# Patient Record
Sex: MALE | Race: WHITE | URBAN - METROPOLITAN AREA
[De-identification: names, ages, dates, MRNs, and addresses within clinical notes are randomized per-mention and may not be internally consistent; named-entity substitution may affect disease eponyms.]

---

## 2018-03-01 NOTE — PROGRESS NOTES
SUBJECTIVE:   Dominik Jang is a 27 year old male who presents to clinic today for the following health issues:      ABDOMINAL PAIN and mid-upper back pain-saw different doc last week did lipase test and results were normal. [facility]     Onset: a few months ago    Description:   Character: Stomach-dull ache, back more sever ache  Location: mid to upper back, upper abdomen  Radiation: Back    Intensity: moderate    Progression of Symptoms:  worsening    Accompanying Signs & Symptoms:  Fever/Chills?: no   Gas/Bloating: YES- bloating  Nausea: no   Vomitting: no   Diarrhea?: no   Constipation:no   Dysuria or Hematuria: no    History:   Trauma: no   Previous similar pain: no    Previous tests done: Labs with [facility]    Precipitating factors:   Does the pain change with:     Food: no      BM: no     Urination: no     Alleviating factors:  fasting    Therapies Tried and outcome: prilosec-no help, after trying for 3-4 days, ibuprofen for pain         Had normal LFTs, lipase, H. pylori IgG last week.  Was offered RUQ U/S but did not pursue apparently.    Does have episodes of RUQ and back pain 2 hours after meals lasting a few hours.  No fever, chills, or night sweats, urinary urgency, frequency, dysuria, hematuria, other abdominal pain, nausea, vomiting, diarrhea, hematochezia, melena, or acholic stools.    Drinking about a beer every 2-3 weeks now.    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 RUQ abdominal pain    2. Chronic right-sided thoracic back pain    3. Gastroesophageal reflux disease, esophagitis presence not specified        PMH: Updated and/or reviewed in chart.    PSH: Updated and/or reviewed in chart.    Family History: Updated and/or reviewed in chart.   Family History   Problem Relation Age of Onset     Gallbladder Disease Mother      Gallbladder Disease Maternal Grandmother        ROS:  Constitutional,  HEENT, cardiovascular, pulmonary, gi and gu systems are otherwise negative.    OBJECTIVE:                                                    /80  Pulse 68  Temp 98.3  F (36.8  C) (Oral)  Wt 167 lb 9.6 oz (76 kg)  SpO2 97%    GEN: No acute distress  EYES: No conjunctival injection or icterus, EOMI grossly intact  RESP: Unlabored, regular  ABDO: soft, mildly tender RUQ, otherwise non-tender, non-distended, no hepatosplenomegaly or masses, negative vs equivocal Eden's  BACK: no CVA tenderness to palpation   NEURO: Normal gait, MAEx4, light touch sensation grossly intact  PSYCH: Normal mood and affect       ASSESSMENT/PLAN:                                                    1. RUQ abdominal pain  2. Chronic right-sided thoracic back pain  I agree with previous assessments that it is reasonable to pursue RUQ U/S to evaluate for GB disease.  If negative, would consider GERD, PUD as contributors and more aggressively pursue treatment there.  Patient agreed with plan and demonstrated understanding to contact us for help if not improving or sooner if worsening or if other questions or concerns arise.  Non-acute today.    - US Abdomen Limited; Future    3. Gastroesophageal reflux disease, esophagitis presence not specified  Discussed GERD hygiene briefly.       Orders Placed This Encounter     US Abdomen Limited              Jesus De Anda MD

## 2018-03-02 ENCOUNTER — OFFICE VISIT (OUTPATIENT)
Dept: INTERNAL MEDICINE | Facility: CLINIC | Age: 28
End: 2018-03-02
Payer: COMMERCIAL

## 2018-03-02 VITALS
TEMPERATURE: 98.3 F | OXYGEN SATURATION: 97 % | DIASTOLIC BLOOD PRESSURE: 80 MMHG | WEIGHT: 167.6 LBS | HEART RATE: 68 BPM | SYSTOLIC BLOOD PRESSURE: 132 MMHG

## 2018-03-02 DIAGNOSIS — K21.9 GASTROESOPHAGEAL REFLUX DISEASE, ESOPHAGITIS PRESENCE NOT SPECIFIED: ICD-10-CM

## 2018-03-02 DIAGNOSIS — M54.6 CHRONIC RIGHT-SIDED THORACIC BACK PAIN: ICD-10-CM

## 2018-03-02 DIAGNOSIS — R10.11 RUQ ABDOMINAL PAIN: Primary | ICD-10-CM

## 2018-03-02 DIAGNOSIS — G89.29 CHRONIC RIGHT-SIDED THORACIC BACK PAIN: ICD-10-CM

## 2018-03-02 PROCEDURE — 99203 OFFICE O/P NEW LOW 30 MIN: CPT | Performed by: INTERNAL MEDICINE

## 2018-03-02 NOTE — NURSING NOTE
Chief Complaint   Patient presents with     Abdominal Pain     Back Pain       Initial /80  Pulse 68  Temp 98.3  F (36.8  C) (Oral)  Wt 167 lb 9.6 oz (76 kg)  SpO2 97% There is no height or weight on file to calculate BMI.  Medication Reconciliation: complete   Moira Pearl MA

## 2018-03-02 NOTE — MR AVS SNAPSHOT
"              After Visit Summary   3/2/2018    Dominik Jang    MRN: [id number]           Patient Information     Date Of Birth          1990        Visit Information        Provider Department      3/2/2018 1:30 PM Jesus De Anda MD [facility]        Today's Diagnoses     RUQ abdominal pain    -  1    Chronic right-sided thoracic back pain        Gastroesophageal reflux disease, esophagitis presence not specified           Follow-ups after your visit        Future tests that were ordered for you today     Open Future Orders        Priority Expected Expires Ordered    US Abdomen Limited Routine  3/2/2019 3/2/2018            Who to contact     Normal or non-critical lab and imaging results will be communicated to you by [employer], letter or phone within 4 business days after the clinic has received the results. If you do not hear from us within 7 days, please contact the clinic through [employer] or phone. If you have a critical or abnormal lab result, we will notify you by phone as soon as possible.  Submit refill requests through [employer] or call your pharmacy and they will forward the refill request to us. Please allow 3 business days for your refill to be completed.          If you need to speak with a  for additional information , please call: [phone]             Additional Information About Your Visit        [employer] Managed Hosting Information     [employer] lets you send messages to your doctor, view your test results, renew your prescriptions, schedule appointments and more. To sign up, go to [email or web address] . Click on \"Log in\" on the left side of the screen, which will take you to the Welcome page. Then click on \"Sign up Now\" on the right side of the page.     You will be asked to enter the access code listed below, as well as some personal information. Please follow the directions to create your username and password.     Your access code is: [email or web address]  Expires: 5/31/2018  2:00 " PM     Your access code will  in 90 days. If you need help or a new code, please call your [facility] clinic or [phone].        Care EveryWhere ID     This is your Care EveryWhere ID. This could be used by other organizations to access your [facility] medical records  [id number]        Your Vitals Were     Pulse Temperature Pulse Oximetry             68 98.3  F (36.8  C) (Oral) 97%          Blood Pressure from Last 3 Encounters:   18 132/80   10/27/10 122/80    Weight from Last 3 Encounters:   18 167 lb 9.6 oz (76 kg)   10/27/10 142 lb (64.4 kg)               Primary Care Provider Office Phone # Fax #    [facility] [place] [phone] [address] [facility] [address]        Equal Access to Services     TOBY PARKINSON : Hadii aad ku hadasho Soomaali, waaxda luqadaha, qaybta kaalmada adeegyada, mireya allen hayjackie hilton . So [facility] [phone].    ATENCIÓN: Si habla español, tiene a klein disposición servicios gratuitos de asistencia lingüística. Alexis al [phone].    We comply with applicable federal civil rights laws and [place] laws. We do not discriminate on the basis of race, color, national origin, age, disability, sex, sexual orientation, or gender identity.            Thank you!     Thank you for choosing [facility]  for your care. Our goal is always to provide you with excellent care. Hearing back from our patients is one way we can continue to improve our services. Please take a few minutes to complete the written survey that you may receive in the mail after your visit with us. Thank you!             Your Updated Medication List - Protect others around you: Learn how to safely use, store and throw away your medicines at www.disposemymeds.org.      Notice  As of 3/2/2018  2:00 PM    You have not been prescribed any medications.

## 2018-03-05 ENCOUNTER — RADIANT APPOINTMENT (OUTPATIENT)
Dept: ULTRASOUND IMAGING | Facility: CLINIC | Age: 28
End: 2018-03-05
Attending: INTERNAL MEDICINE
Payer: COMMERCIAL

## 2018-03-05 DIAGNOSIS — M54.6 CHRONIC RIGHT-SIDED THORACIC BACK PAIN: ICD-10-CM

## 2018-03-05 DIAGNOSIS — G89.29 CHRONIC RIGHT-SIDED THORACIC BACK PAIN: ICD-10-CM

## 2018-03-05 PROCEDURE — 76705 ECHO EXAM OF ABDOMEN: CPT

## 2018-03-06 ENCOUNTER — TELEPHONE (OUTPATIENT)
Dept: INTERNAL MEDICINE | Facility: CLINIC | Age: 28
End: 2018-03-06

## 2018-03-06 DIAGNOSIS — K80.12 CALCULUS OF GALLBLADDER WITH ACUTE ON CHRONIC CHOLECYSTITIS WITHOUT OBSTRUCTION: Primary | ICD-10-CM

## 2018-03-06 NOTE — TELEPHONE ENCOUNTER
Patient given US result and referral to general surgery information. Letter and result mailed to patient.

## 2018-03-12 ENCOUNTER — OFFICE VISIT (OUTPATIENT)
Dept: SURGERY | Facility: CLINIC | Age: 28
End: 2018-03-12
Attending: INTERNAL MEDICINE
Payer: COMMERCIAL

## 2018-03-12 ENCOUNTER — TELEPHONE (OUTPATIENT)
Dept: SURGERY | Facility: CLINIC | Age: 28
End: 2018-03-12

## 2018-03-12 VITALS
BODY MASS INDEX: 22.82 KG/M2 | SYSTOLIC BLOOD PRESSURE: 124 MMHG | HEIGHT: 71 IN | HEART RATE: 77 BPM | WEIGHT: 163 LBS | DIASTOLIC BLOOD PRESSURE: 81 MMHG

## 2018-03-12 DIAGNOSIS — K80.50 BILIARY COLIC: ICD-10-CM

## 2018-03-12 DIAGNOSIS — K80.20 CALCULUS OF GALLBLADDER WITHOUT CHOLECYSTITIS WITHOUT OBSTRUCTION: Primary | ICD-10-CM

## 2018-03-12 PROCEDURE — 99204 OFFICE O/P NEW MOD 45 MIN: CPT | Performed by: SURGERY

## 2018-03-12 NOTE — PROGRESS NOTES
"Patient seen in consultation for gallbladder by Jesus De Anda MD    HPI:  Patient is a 27 year old male  with complaints of bloating and abdominal pain/discomfort radiating into back pain  The patient noticed the symptoms about 3-4 months ago.   Happens few hours after eating. Seems to be related to amount eaten.  Gets something every day and gets bad a few times a week.  No nausea/vomiting or fevers   time makes the episode better. Pain usually lasts a few hours before subsiding  Patient has family history of gallbladder problems in mother and grandmother    Review Of Systems    Skin: negative  Ears/Nose/Throat: negative  Respiratory: No shortness of breath, dyspnea on exertion, cough, or hemoptysis  Cardiovascular: negative  Gastrointestinal: as above no diarrhea/constipation  Genitourinary: negative  Musculoskeletal: negative  Neurologic: negative  Hematologic/Lymphatic/Immunologic: negative  Endocrine: negative      No past medical history on file.    No past surgical history on file.    Social History     Social History     Marital status: Single     Spouse name: N/A     Number of children: N/A     Years of education: N/A     Occupational History     Not on file.     Social History Main Topics     Smoking status: Never Smoker     Smokeless tobacco: Never Used     Alcohol use 0.6 oz/week     1 Cans of beer per week     Drug use: No     Sexual activity: Not on file     Other Topics Concern     Not on file     Social History Narrative     for [employer]    Plays board and video games    Single, lives in [place]       No current outpatient prescriptions on file.   takes PRN prilosec OTC    Medications and history reviewed    Physical exam:  Vitals: /81  Pulse 77  Ht 1.803 m (5' 11\")  Wt 73.9 kg (163 lb)  BMI 22.73 kg/m2  BMI= Body mass index is 22.73 kg/(m^2).    Constitutional: healthy, alert and no distress  Head: Normocephalic. No masses, lesions, tenderness or " abnormalities  Cardiovascular: negative, PMI normal. No lifts, heaves, or thrills. RRR. No murmurs, clicks gallops or rub  Respiratory: negative, Percussion normal. Good diaphragmatic excursion. Lungs clear  Gastrointestinal: Abdomen soft, non-tender. BS normal. No masses, organomegaly  : Deferred  Musculoskeletal: extremities normal- no gross deformities noted, gait normal and normal muscle tone  Skin: no suspicious lesions or rashes  Psychiatric: mentation appears normal and affect normal/bright  Hematologic/Lymphatic/Immunologic: Normal cervical lymph nodes  Patient able to get up on table without difficulty.      Imaging shows: personally viewed  ULTRASOUND ABDOMEN LIMITED   3/5/2018 8:18 AM      HISTORY: Right upper quadrant pain.     COMPARISON: None.     FINDINGS: There is mild diffuse fatty infiltration of the liver. No  focal hepatic lesions are identified. Echogenic shadowing material in  the region of the gallbladder likely represents a gallbladder filled  with stones. The gallbladder wall is not well seen, but does not  appear thickened where visualized. No pericholecystic fluid is  identified. Negative sonographic Eden's sign. No intra or  extrahepatic bile duct dilatation. Common hepatic duct is normal in  diameter. The entire common duct could not be visualized on this exam.  Limited evaluation of the right kidney is unremarkable. Pancreas is  partially obscured by overlying bowel gas, but appears normal where  seen. The abdominal aorta and IVC are of normal caliber where  visualized.         IMPRESSION:   1. Shadowing echogenic material in the region of the gallbladder  likely represents a gallbladder filled with stones. If the patient has  had a prior cholecystectomy, a loop of bowel within the gallbladder  fossa could also have this appearance.  2. Mild diffuse fatty infiltration of the liver.     Assessment:     ICD-10-CM    1. Calculus of gallbladder without cholecystitis without obstruction  K80.20 Tanja-Operative Worksheet   2. Biliary colic K80.50 Tanja-Operative Worksheet     Plan: Discussed imaging findings and what to expect with surgery. Risks of bleeding, infection, anesthesia complications, conversion to open, injury to nearby structures and bile duct injury all discussed.   We went over my discharge instructions and post-op restrictions.  Patient questions answered and we will schedule the procedure.  .    Joaquin Valle MD

## 2018-03-12 NOTE — MR AVS SNAPSHOT
"              After Visit Summary   3/12/2018    Dominik Jang    MRN: [id number]           Patient Information     Date Of Birth          1990        Visit Information        Provider Department      3/12/2018 8:30 AM Joaquin Valle MD [facility]        Today's Diagnoses     Calculus of gallbladder without cholecystitis without obstruction    -  1    Biliary colic           Follow-ups after your visit        Who to contact     If you have questions or need follow up information about today's clinic visit or your schedule please contact [facility] directly at [phone].  Normal or non-critical lab and imaging results will be communicated to you by [employer], letter or phone within 4 business days after the clinic has received the results. If you do not hear from us within 7 days, please contact the clinic through [employer] or phone. If you have a critical or abnormal lab result, we will notify you by phone as soon as possible.  Submit refill requests through [employer] or call your pharmacy and they will forward the refill request to us. Please allow 3 business days for your refill to be completed.          Additional Information About Your Visit        MyChart Information     [employer] lets you send messages to your doctor, view your test results, renew your prescriptions, schedule appointments and more. To sign up, go to [email or web address] . Click on \"Log in\" on the left side of the screen, which will take you to the Welcome page. Then click on \"Sign up Now\" on the right side of the page.     You will be asked to enter the access code listed below, as well as some personal information. Please follow the directions to create your username and password.     Your access code is: [email or web address]  Expires: 2018  3:00 PM     Your access code will  in 90 days. If you need help or a new code, please call your [facility] or [phone].        Care EveryWhere ID     " "This is your Care EveryWhere ID. This could be used by other organizations to access your [facility] medical records  [id number]        Your Vitals Were     Pulse Height BMI (Body Mass Index)             77 1.803 m (5' 11\") 22.73 kg/m2          Blood Pressure from Last 3 Encounters:   03/12/18 124/81   03/02/18 132/80   10/27/10 122/80    Weight from Last 3 Encounters:   03/12/18 73.9 kg (163 lb)   03/02/18 76 kg (167 lb 9.6 oz)   10/27/10 64.4 kg (142 lb)              We Performed the Following     Tanja-Operative Worksheet        Primary Care Provider Office Phone # Fax #    [facility] [phone] [address] [address] [facility] [address]        Equal Access to Services     TOBY PARKINSON : Hadii aad ku hadasho Sonydia, waaxda luqadaha, qaybta kaalmada adepreetyaguadalupe, mireya hilton . So [facility] [phone].    ATENCIÓN: Si habla español, tiene a klein disposición servicios gratuitos de asistencia lingüística. Alexis al [phone].    We comply with applicable federal civil rights laws and [place] laws. We do not discriminate on the basis of race, color, national origin, age, disability, sex, sexual orientation, or gender identity.            Thank you!     Thank you for choosing [facility]  for your care. Our goal is always to provide you with excellent care. Hearing back from our patients is one way we can continue to improve our services. Please take a few minutes to complete the written survey that you may receive in the mail after your visit with us. Thank you!             Your Updated Medication List - Protect others around you: Learn how to safely use, store and throw away your medicines at www.disposemymeds.org.      Notice  As of 3/12/2018  8:37 AM    You have not been prescribed any medications.      "

## 2018-03-12 NOTE — TELEPHONE ENCOUNTER
Type of surgery: Laparoscopic cholecystectomy CPT 75063  Calculus of gallbladder without cholecystitis without obstruction [K80.20]  - Primary       Biliary colic [K80.50]         Location of surgery: [facility]  Date and time of surgery: 3-29-18  2:15pm  Surgeon: Dr Valle  Pre-Op Appt Date: 3-14-18   Post-Op Appt Date: 4-11-18   Packet sent out: Yes  Pre-cert/Authorization completed: no pre cert needed    Date: 03/12/2018

## 2018-03-12 NOTE — TELEPHONE ENCOUNTER
I faxed paperwork to [facility].     Thank you,   Denice Mcnair   [facility] Department  [phone]

## 2018-03-12 NOTE — LETTER
3/12/2018         RE: Dominik Jang  [address]  [place] [address]        Dear Colleague,    Thank you for referring your patient, Dominik Jang, to the [facility]. Please see a copy of my visit note below.    Patient seen in consultation for gallbladder by Jesus De Anda MD    HPI:  Patient is a 27 year old male  with complaints of bloating and abdominal pain/discomfort radiating into back pain  The patient noticed the symptoms about 3-4 months ago.   Happens few hours after eating. Seems to be related to amount eaten.  Gets something every day and gets bad a few times a week.  No nausea/vomiting or fevers   time makes the episode better. Pain usually lasts a few hours before subsiding  Patient has family history of gallbladder problems in mother and grandmother    Review Of Systems    Skin: negative  Ears/Nose/Throat: negative  Respiratory: No shortness of breath, dyspnea on exertion, cough, or hemoptysis  Cardiovascular: negative  Gastrointestinal: as above no diarrhea/constipation  Genitourinary: negative  Musculoskeletal: negative  Neurologic: negative  Hematologic/Lymphatic/Immunologic: negative  Endocrine: negative      No past medical history on file.    No past surgical history on file.    Social History     Social History     Marital status: Single     Spouse name: N/A     Number of children: N/A     Years of education: N/A     Occupational History     Not on file.     Social History Main Topics     Smoking status: Never Smoker     Smokeless tobacco: Never Used     Alcohol use 0.6 oz/week     1 Cans of beer per week     Drug use: No     Sexual activity: Not on file     Other Topics Concern     Not on file     Social History Narrative     for [employer]    Plays board and video games    Single, lives in [place]       No current outpatient prescriptions on file.   takes PRN prilosec OTC    Medications and history reviewed    Physical exam:  Vitals: /81  Pulse 77   "Ht 1.803 m (5' 11\")  Wt 73.9 kg (163 lb)  BMI 22.73 kg/m2  BMI= Body mass index is 22.73 kg/(m^2).    Constitutional: healthy, alert and no distress  Head: Normocephalic. No masses, lesions, tenderness or abnormalities  Cardiovascular: negative, PMI normal. No lifts, heaves, or thrills. RRR. No murmurs, clicks gallops or rub  Respiratory: negative, Percussion normal. Good diaphragmatic excursion. Lungs clear  Gastrointestinal: Abdomen soft, non-tender. BS normal. No masses, organomegaly  : Deferred  Musculoskeletal: extremities normal- no gross deformities noted, gait normal and normal muscle tone  Skin: no suspicious lesions or rashes  Psychiatric: mentation appears normal and affect normal/bright  Hematologic/Lymphatic/Immunologic: Normal cervical lymph nodes  Patient able to get up on table without difficulty.      Imaging shows: personally viewed  ULTRASOUND ABDOMEN LIMITED   3/5/2018 8:18 AM      HISTORY: Right upper quadrant pain.     COMPARISON: None.     FINDINGS: There is mild diffuse fatty infiltration of the liver. No  focal hepatic lesions are identified. Echogenic shadowing material in  the region of the gallbladder likely represents a gallbladder filled  with stones. The gallbladder wall is not well seen, but does not  appear thickened where visualized. No pericholecystic fluid is  identified. Negative sonographic Eden's sign. No intra or  extrahepatic bile duct dilatation. Common hepatic duct is normal in  diameter. The entire common duct could not be visualized on this exam.  Limited evaluation of the right kidney is unremarkable. Pancreas is  partially obscured by overlying bowel gas, but appears normal where  seen. The abdominal aorta and IVC are of normal caliber where  visualized.         IMPRESSION:   1. Shadowing echogenic material in the region of the gallbladder  likely represents a gallbladder filled with stones. If the patient has  had a prior cholecystectomy, a loop of bowel within " the gallbladder  fossa could also have this appearance.  2. Mild diffuse fatty infiltration of the liver.     Assessment:     ICD-10-CM    1. Calculus of gallbladder without cholecystitis without obstruction K80.20 Tanja-Operative Worksheet   2. Biliary colic K80.50 Tanja-Operative Worksheet     Plan: Discussed imaging findings and what to expect with surgery. Risks of bleeding, infection, anesthesia complications, conversion to open, injury to nearby structures and bile duct injury all discussed.   We went over my discharge instructions and post-op restrictions.  Patient questions answered and we will schedule the procedure.  .    Joaquin Valle MD      Again, thank you for allowing me to participate in the care of your patient.        Sincerely,        Joaquin Valle MD

## 2018-03-12 NOTE — NURSING NOTE
"Chief Complaint   Patient presents with     Abdominal Pain       Initial /81  Pulse 77  Ht 1.803 m (5' 11\")  Wt 73.9 kg (163 lb)  BMI 22.73 kg/m2 Estimated body mass index is 22.73 kg/(m^2) as calculated from the following:    Height as of this encounter: 1.803 m (5' 11\").    Weight as of this encounter: 73.9 kg (163 lb).  Medication Reconciliation: alexis Pearl Cma      "

## 2018-03-14 ENCOUNTER — OFFICE VISIT (OUTPATIENT)
Dept: INTERNAL MEDICINE | Facility: CLINIC | Age: 28
End: 2018-03-14
Payer: COMMERCIAL

## 2018-03-14 VITALS
HEART RATE: 71 BPM | WEIGHT: 163 LBS | RESPIRATION RATE: 16 BRPM | DIASTOLIC BLOOD PRESSURE: 73 MMHG | BODY MASS INDEX: 22.82 KG/M2 | TEMPERATURE: 97.7 F | SYSTOLIC BLOOD PRESSURE: 119 MMHG | HEIGHT: 71 IN

## 2018-03-14 DIAGNOSIS — K80.01 CALCULUS OF GALLBLADDER WITH ACUTE CHOLECYSTITIS AND OBSTRUCTION: ICD-10-CM

## 2018-03-14 DIAGNOSIS — Z01.818 PREOP GENERAL PHYSICAL EXAM: Primary | ICD-10-CM

## 2018-03-14 LAB
CREAT SERPL-MCNC: 0.94 MG/DL (ref 0.66–1.25)
GFR SERPL CREATININE-BSD FRML MDRD: >90 ML/MIN/1.7M2
HGB BLD-MCNC: 14.9 G/DL (ref 13.3–17.7)
POTASSIUM SERPL-SCNC: 4.6 MMOL/L (ref 3.4–5.3)

## 2018-03-14 PROCEDURE — 36415 COLL VENOUS BLD VENIPUNCTURE: CPT | Performed by: INTERNAL MEDICINE

## 2018-03-14 PROCEDURE — 84132 ASSAY OF SERUM POTASSIUM: CPT | Performed by: INTERNAL MEDICINE

## 2018-03-14 PROCEDURE — 99214 OFFICE O/P EST MOD 30 MIN: CPT | Performed by: INTERNAL MEDICINE

## 2018-03-14 PROCEDURE — 85018 HEMOGLOBIN: CPT | Performed by: INTERNAL MEDICINE

## 2018-03-14 PROCEDURE — 82565 ASSAY OF CREATININE: CPT | Performed by: INTERNAL MEDICINE

## 2018-03-14 NOTE — NURSING NOTE
"Chief Complaint   Patient presents with     Pre-Op Exam       Initial /73 (BP Location: Left arm, Patient Position: Sitting, Cuff Size: Adult Regular)  Pulse 71  Temp 97.7  F (36.5  C) (Tympanic)  Resp 16  Ht 5' 11\" (1.803 m)  Wt 163 lb (73.9 kg)  BMI 22.73 kg/m2 Estimated body mass index is 22.73 kg/(m^2) as calculated from the following:    Height as of this encounter: 5' 11\" (1.803 m).    Weight as of this encounter: 163 lb (73.9 kg).  Medication Reconciliation: complete    "

## 2018-03-14 NOTE — PROGRESS NOTES
[facility] [place]  [address] [address]  [place] [address]  [phone]  Dept: [phone]    PRE-OP EVALUATION:  Today's date: 3/14/2018    Dominik Jang (: 1990) presents for pre-operative evaluation assessment as requested by Dr. Valle.  He requires evaluation and anesthesia risk assessment prior to undergoing surgery/procedure for treatment of  Calculus of gallbladder .    Proposed Surgery/ Procedure: Laparoscopic cholecystectomy CPT  Date of Surgery/ Procedure: 3/29/2018  Time of Surgery/ Procedure: 2:15  Hospital/Surgical Facility: [facility]  Fax number for surgical facility: TBD  Primary Physician: Aníbal Osorio  Type of Anesthesia Anticipated: general    Patient has a Health Care Directive or Living Will:  NO    1. NO - Do you have a history of heart attack, stroke, stent, bypass or surgery on an artery in the head, neck, heart or legs?  2. YES - Do you ever have any pain or discomfort in your chest?from gallbladder  3. NO - Do you have a history of  Heart Failure?  4. NO - Are you troubled by shortness of breath when: walking on the level, up a slight hill or at night?  5. NO - Do you currently have a cold, bronchitis or other respiratory infection?  6. NO - Do you have a cough, shortness of breath or wheezing?  7. NO - Do you sometimes get pains in the calves of your legs when you walk?  8. YES - Do you or anyone in your family have previous history of blood clots?Maternal great grandmother  9. NO - Do you or does anyone in your family have a serious bleeding problem such as prolonged bleeding following surgeries or cuts?  10. YES - Have you ever had problems with anemia or been told to take iron pills? Childhood anemia  11. NO - Have you had any abnormal blood loss such as black, tarry or bloody stools, or abnormal vaginal bleeding?  12. NO - Have you ever had a blood transfusion?  13. NO - Have you or any of your relatives ever had problems with  "anesthesia?  14. NO - Do you have sleep apnea, excessive snoring or daytime drowsiness?  15. NO - Do you have any prosthetic heart valves?  16. NO - Do you have prosthetic joints?  17. NO - Is there any chance that you may be pregnant?      HPI:     HPI related to upcoming procedure: unknown trigger of temporary anemia that resolved quickly after limited iron supplementation in childhood.  Remains in good health otherwise.  Family medical history of clot in grandmother was triggered by surgery.      MEDICAL HISTORY:      History reviewed. No pertinent past medical history.  History reviewed. No pertinent surgical history.  No current outpatient prescriptions on file.     OTC products: None, except as noted above    No Known Allergies   Latex Allergy: NO    Social History   Substance Use Topics     Smoking status: Never Smoker     Smokeless tobacco: Never Used     Alcohol use 0.6 oz/week     1 Cans of beer per week     History   Drug Use No       REVIEW OF SYSTEMS:   Constitutional, neuro, ENT, endocrine, pulmonary, cardiac, gastrointestinal, genitourinary, musculoskeletal, integument and psychiatric systems are negative, except as otherwise noted.    EXAM:   /73 (BP Location: Left arm, Patient Position: Sitting, Cuff Size: Adult Regular)  Pulse 71  Temp 97.7  F (36.5  C) (Tympanic)  Resp 16  Ht 5' 11\" (1.803 m)  Wt 163 lb (73.9 kg)  BMI 22.73 kg/m2    GENERAL APPEARANCE: healthy, alert and no distress     EYES: EOMI,  PERRL     HENT: ear canals and TM's normal and nose and mouth without ulcers or lesions     NECK: no adenopathy, no asymmetry, masses, or scars and thyroid normal to palpation     RESP: lungs clear to auscultation - no rales, rhonchi or wheezes     CV: regular rates and rhythm, normal S1 S2, no S3 or S4 and no murmur, click or rub     ABDOMEN:  Guarding of RUQ, otherwise soft, nontender, no masses and bowel sounds normal     MS: extremities normal- no gross deformities noted, no evidence of " inflammation in joints, FROM in all extremities.     SKIN: no suspicious lesions or rashes     NEURO: Normal strength and tone, sensory exam grossly normal, mentation intact and speech normal     PSYCH: mentation appears normal. and affect normal/bright     LYMPHATICS: No cervical adenopathy    DIAGNOSTICS:     Hemoglobin (indicated for history of anemia or procedure with significant blood loss such as tonsillectomy, major intraperitoneal surgery, vascular surgery, major spine surgery, total joint replacement)  Serum Potassium  Serum Creatinine    No results for input(s): HGB, PLT, INR, NA, POTASSIUM, CR, A1C in the last 46472 hours.     IMPRESSION:   Reason for surgery/procedure: cholelithiasis with cholecystitis  Diagnosis/reason for consult: preop clearance for lap cholecystectomy    The proposed surgical procedure is considered INTERMEDIATE risk.    REVISED CARDIAC RISK INDEX  The patient has the following serious cardiovascular risks for perioperative complications such as (MI, PE, VFib and 3  AV Block):  No serious cardiac risks  INTERPRETATION: 0 risks: Class I (very low risk - 0.4% complication rate)    The patient has the following additional risks for perioperative complications:  No identified additional risks      ICD-10-CM    1. Preop general physical exam Z01.818 Creatinine     Potassium     Hemoglobin   2. Calculus of gallbladder with acute cholecystitis and obstruction K80.01        RECOMMENDATIONS:     --Patient is on no chronic medications    APPROVAL GIVEN to proceed with proposed procedure, without further diagnostic evaluation       Signed Electronically by: Jesus De Anda MD    Copy of this evaluation report is provided to requesting physician.    Aníbal Preop Guidelines

## 2018-03-14 NOTE — MR AVS SNAPSHOT
After Visit Summary   3/14/2018    Dominik Jang    MRN: [id number]           Patient Information     Date Of Birth          1990        Visit Information        Provider Department      3/14/2018 2:00 PM Jesus De Anda MD [facility]        Today's Diagnoses     Preop general physical exam    -  1    Calculus of gallbladder with acute cholecystitis and obstruction          Care Instructions      Before Your Surgery      Call your surgeon if there is any change in your health. This includes signs of a cold or flu (such as a sore throat, runny nose, cough, rash or fever).    Do not smoke, drink alcohol or take over the counter medicine (unless your surgeon or primary care doctor tells you to) for the 24 hours before and after surgery.    If you take prescribed drugs: Follow your doctor s orders about which medicines to take and which to stop until after surgery.    Eating and drinking prior to surgery: follow the instructions from your surgeon    Take a shower or bath the night before surgery. Use the soap your surgeon gave you to gently clean your skin. If you do not have soap from your surgeon, use your regular soap. Do not shave or scrub the surgery site.  Wear clean pajamas and have clean sheets on your bed.           Follow-ups after your visit        Your next 10 appointments already scheduled     Apr 11, 2018  7:45 AM CDT   Post Op with Joaquin Valle MD   [facility] ([facility])    [address] [place] [address]   [phone]              Who to contact     Normal or non-critical lab and imaging results will be communicated to you by MyChart, letter or phone within 4 business days after the clinic has received the results. If you do not hear from us within 7 days, please contact the clinic through MyChart or phone. If you have a critical or abnormal lab result, we will notify you by phone as soon as possible.  Submit refill  "requests through [employer] or call your pharmacy and they will forward the refill request to us. Please allow 3 business days for your refill to be completed.          If you need to speak with a  for additional information , please call: [phone]             Additional Information About Your Visit        [employer] Information     [employer] lets you send messages to your doctor, view your test results, renew your prescriptions, schedule appointments and more. To sign up, go to [email or web address] . Click on \"Log in\" on the left side of the screen, which will take you to the Welcome page. Then click on \"Sign up Now\" on the right side of the page.     You will be asked to enter the access code listed below, as well as some personal information. Please follow the directions to create your username and password.     Your access code is: [email or web address]  Expires: 2018  3:00 PM     Your access code will  in 90 days. If you need help or a new code, please call your [facility] clinic or [phone].        Care EveryWhere ID     This is your Care EveryWhere ID. This could be used by other organizations to access your [facility] medical records  [id number]        Your Vitals Were     Pulse Temperature Respirations Height BMI (Body Mass Index)       71 97.7  F (36.5  C) (Tympanic) 16 5' 11\" (1.803 m) 22.73 kg/m2        Blood Pressure from Last 3 Encounters:   18 119/73   18 124/81   18 132/80    Weight from Last 3 Encounters:   18 163 lb (73.9 kg)   18 163 lb (73.9 kg)   18 167 lb 9.6 oz (76 kg)              We Performed the Following     Creatinine     Hemoglobin     Potassium        Primary Care Provider Office Phone # Fax #    [facility] [phone] [address] [facility] [address]        Equal Access to Services     TOBY PARKINSON AH: Ayala Caceres, howard tse, mireya coleman " deidre sherKpaanancie ah. So [facility] [phone].    ATENCIÓN: Si habla mellisaañol, tiene a klein disposición servicios gratuitos de asistencia lingüística. Alexis al [phone].    We comply with applicable federal civil rights laws and [place] laws. We do not discriminate on the basis of race, color, national origin, age, disability, sex, sexual orientation, or gender identity.            Thank you!     Thank you for choosing [facility]  for your care. Our goal is always to provide you with excellent care. Hearing back from our patients is one way we can continue to improve our services. Please take a few minutes to complete the written survey that you may receive in the mail after your visit with us. Thank you!             Your Updated Medication List - Protect others around you: Learn how to safely use, store and throw away your medicines at www.disposemymeds.org.      Notice  As of 3/14/2018  2:36 PM    You have not been prescribed any medications.

## 2018-03-21 ENCOUNTER — TELEPHONE (OUTPATIENT)
Dept: SURGERY | Facility: CLINIC | Age: 28
End: 2018-03-21

## 2018-03-21 NOTE — TELEPHONE ENCOUNTER
Reason for Call:  Other call back    Detailed comments: Per VM left 11:30am-Pt is scheduled for surgery 3-29-18. Pt would like to know if someone can observe the surgery. Please call pt to advise.    Phone Number Patient can be reached at: Home number on file [phone] (home)    Best Time: any    Can we leave a detailed message on this number? YES    Call taken on 3/21/2018 at 11:54 AM by Aleja Sosa

## 2018-03-29 ENCOUNTER — TRANSFERRED RECORDS (OUTPATIENT)
Dept: HEALTH INFORMATION MANAGEMENT | Facility: CLINIC | Age: 28
End: 2018-03-29

## 2018-04-09 ENCOUNTER — OFFICE VISIT (OUTPATIENT)
Dept: SURGERY | Facility: CLINIC | Age: 28
End: 2018-04-09
Payer: COMMERCIAL

## 2018-04-09 VITALS
HEIGHT: 71 IN | BODY MASS INDEX: 22.82 KG/M2 | DIASTOLIC BLOOD PRESSURE: 73 MMHG | WEIGHT: 163 LBS | SYSTOLIC BLOOD PRESSURE: 125 MMHG | HEART RATE: 72 BPM

## 2018-04-09 DIAGNOSIS — Z90.49 S/P LAPAROSCOPIC CHOLECYSTECTOMY: Primary | ICD-10-CM

## 2018-04-09 PROCEDURE — 99024 POSTOP FOLLOW-UP VISIT: CPT | Performed by: SURGERY

## 2018-04-09 NOTE — MR AVS SNAPSHOT
"              After Visit Summary   2018    Dominik Jang    MRN: [id number]           Patient Information     Date Of Birth          1990        Visit Information        Provider Department      2018 8:45 AM Joaquin Valle MD [facility]        Today's Diagnoses     S/P laparoscopic cholecystectomy    -  1       Follow-ups after your visit        Who to contact     If you have questions or need follow up information about today's clinic visit or your schedule please contact [facility] directly at [phone].  Normal or non-critical lab and imaging results will be communicated to you by [employer], letter or phone within 4 business days after the clinic has received the results. If you do not hear from us within 7 days, please contact the clinic through [employer] or phone. If you have a critical or abnormal lab result, we will notify you by phone as soon as possible.  Submit refill requests through [employer] or call your pharmacy and they will forward the refill request to us. Please allow 3 business days for your refill to be completed.          Additional Information About Your Visit        MyChart Information     [employer] lets you send messages to your doctor, view your test results, renew your prescriptions, schedule appointments and more. To sign up, go to [email or web address] . Click on \"Log in\" on the left side of the screen, which will take you to the Welcome page. Then click on \"Sign up Now\" on the right side of the page.     You will be asked to enter the access code listed below, as well as some personal information. Please follow the directions to create your username and password.     Your access code is: [email or web address]  Expires: 2018  3:00 PM     Your access code will  in 90 days. If you need help or a new code, please call your [facility] or [phone].        Care EveryWhere ID     This is your Care EveryWhere ID. This could be used by " "other organizations to access your [facility] medical records  [id number]        Your Vitals Were     Pulse Height BMI (Body Mass Index)             72 1.803 m (5' 11\") 22.73 kg/m2          Blood Pressure from Last 3 Encounters:   04/09/18 125/73   03/14/18 119/73   03/12/18 124/81    Weight from Last 3 Encounters:   04/09/18 73.9 kg (163 lb)   03/14/18 73.9 kg (163 lb)   03/12/18 73.9 kg (163 lb)              Today, you had the following     No orders found for display       Primary Care Provider Office Phone # Fax #    [facility] [phone] [address] [facility] [address]        Equal Access to Services     TOBY PARKINSON : Ayala ruelas Sonydia, waaxda luqadaha, qaybta kaalmada adeegyada, mireya hilton . So [facility] [phone].    ATENCIÓN: Si habla español, tiene a klein disposición servicios gratuitos de asistencia lingüística. Llame al [phone].    We comply with applicable federal civil rights laws and [place] laws. We do not discriminate on the basis of race, color, national origin, age, disability, sex, sexual orientation, or gender identity.            Thank you!     Thank you for choosing [facility] [place]  for your care. Our goal is always to provide you with excellent care. Hearing back from our patients is one way we can continue to improve our services. Please take a few minutes to complete the written survey that you may receive in the mail after your visit with us. Thank you!             Your Updated Medication List - Protect others around you: Learn how to safely use, store and throw away your medicines at www.disposemymeds.org.      Notice  As of 4/9/2018  9:00 AM    You have not been prescribed any medications.      "

## 2018-04-09 NOTE — NURSING NOTE
"Chief Complaint   Patient presents with     Surgical Followup       Initial /73  Pulse 72  Ht 1.803 m (5' 11\")  Wt 73.9 kg (163 lb)  BMI 22.73 kg/m2 Estimated body mass index is 22.73 kg/(m^2) as calculated from the following:    Height as of this encounter: 1.803 m (5' 11\").    Weight as of this encounter: 73.9 kg (163 lb).  Medication Reconciliation: alexis Pearl Cma      "

## 2018-04-09 NOTE — PROGRESS NOTES
"General Surgery Post Op    Pt returns for follow up visit s/p jazmyn rodriguez on 3/29.    Patient has been doing well, tolerating diet. Bowels moving well. Pain controlled. No issues with wound healing/redness/drainage. No fevers.    Physical exam: Vitals: /73  Pulse 72  Ht 1.803 m (5' 11\")  Wt 73.9 kg (163 lb)  BMI 22.73 kg/m2  BMI= Body mass index is 22.73 kg/(m^2).    Exam:  Constitutional: healthy, alert and no distress  Cardiovascular: negative, PMI normal. No lifts, heaves, or thrills. RRR. No murmurs, clicks gallops or rub  Respiratory: negative, Percussion normal. Good diaphragmatic excursion. Lungs clear  Gastrointestinal: Abdomen soft, non-tender. BS normal. No masses, organomegaly  Incisions healing well    Path:  Final Diagnosis   Gallbladder, cholecystectomy-Chronic cholecystitis and cholelithiasis.         Assessment:     ICD-10-CM    1. S/P laparoscopic cholecystectomy Z90.49      Plan: Doing well, discussed pathology. Activities as tolerated. Follow up as needed    Joaquin Valle MD      "

## 2018-04-09 NOTE — LETTER
"    4/9/2018         RE: Dominik Jang  [address]  [place] [address]        Dear Colleague,    Thank you for referring your patient, Dominik Jang, to the [facility]. Please see a copy of my visit note below.    General Surgery Post Op    Pt returns for follow up visit s/p jazmyn marke on 3/29.    Patient has been doing well, tolerating diet. Bowels moving well. Pain controlled. No issues with wound healing/redness/drainage. No fevers.    Physical exam: Vitals: /73  Pulse 72  Ht 1.803 m (5' 11\")  Wt 73.9 kg (163 lb)  BMI 22.73 kg/m2  BMI= Body mass index is 22.73 kg/(m^2).    Exam:  Constitutional: healthy, alert and no distress  Cardiovascular: negative, PMI normal. No lifts, heaves, or thrills. RRR. No murmurs, clicks gallops or rub  Respiratory: negative, Percussion normal. Good diaphragmatic excursion. Lungs clear  Gastrointestinal: Abdomen soft, non-tender. BS normal. No masses, organomegaly  Incisions healing well    Path:  Final Diagnosis   Gallbladder, cholecystectomy-Chronic cholecystitis and cholelithiasis.         Assessment:     ICD-10-CM    1. S/P laparoscopic cholecystectomy Z90.49      Plan: Doing well, discussed pathology. Activities as tolerated. Follow up as needed    Joaquin Valle MD        Again, thank you for allowing me to participate in the care of your patient.        Sincerely,        Joaquin Valle MD    "

## 2018-10-16 ENCOUNTER — OFFICE VISIT (OUTPATIENT)
Dept: INTERNAL MEDICINE | Facility: CLINIC | Age: 28
End: 2018-10-16
Payer: COMMERCIAL

## 2018-10-16 VITALS
TEMPERATURE: 98.1 F | RESPIRATION RATE: 16 BRPM | BODY MASS INDEX: 23.38 KG/M2 | DIASTOLIC BLOOD PRESSURE: 78 MMHG | SYSTOLIC BLOOD PRESSURE: 121 MMHG | HEIGHT: 71 IN | WEIGHT: 167 LBS | HEART RATE: 109 BPM

## 2018-10-16 DIAGNOSIS — R39.9 SYMPTOMS INVOLVING URINARY SYSTEM: Primary | ICD-10-CM

## 2018-10-16 DIAGNOSIS — N41.0 ACUTE PROSTATITIS: ICD-10-CM

## 2018-10-16 LAB
ALBUMIN UR-MCNC: NEGATIVE MG/DL
APPEARANCE UR: CLEAR
BILIRUB UR QL STRIP: NEGATIVE
COLOR UR AUTO: YELLOW
GLUCOSE UR STRIP-MCNC: NEGATIVE MG/DL
HGB UR QL STRIP: ABNORMAL
KETONES UR STRIP-MCNC: NEGATIVE MG/DL
LEUKOCYTE ESTERASE UR QL STRIP: NEGATIVE
NITRATE UR QL: NEGATIVE
PH UR STRIP: 7 PH (ref 5–7)
RBC #/AREA URNS AUTO: NORMAL /HPF
SOURCE: ABNORMAL
SP GR UR STRIP: 1.01 (ref 1–1.03)
UROBILINOGEN UR STRIP-ACNC: 0.2 EU/DL (ref 0.2–1)
WBC #/AREA URNS AUTO: NORMAL /HPF

## 2018-10-16 PROCEDURE — 81001 URINALYSIS AUTO W/SCOPE: CPT | Performed by: INTERNAL MEDICINE

## 2018-10-16 PROCEDURE — 99213 OFFICE O/P EST LOW 20 MIN: CPT | Performed by: INTERNAL MEDICINE

## 2018-10-16 RX ORDER — SULFAMETHOXAZOLE/TRIMETHOPRIM 800-160 MG
1 TABLET ORAL 2 TIMES DAILY
Qty: 28 TABLET | Refills: 0 | Status: SHIPPED | OUTPATIENT
Start: 2018-10-16 | End: 2018-10-24

## 2018-10-16 NOTE — MR AVS SNAPSHOT
"              After Visit Summary   10/16/2018    Dominik Jang    MRN: [id number]           Patient Information     Date Of Birth          1990        Visit Information        Provider Department      10/16/2018 3:00 PM Jesus De Anda MD [facility] [place]        Today's Diagnoses     Symptoms involving urinary system    -  1    Acute prostatitis           Follow-ups after your visit        Follow-up notes from your care team     Return in about 2 weeks (around 10/30/2018), or if symptoms worsen or fail to improve.      Who to contact     Normal or non-critical lab and imaging results will be communicated to you by MyChart, letter or phone within 4 business days after the clinic has received the results. If you do not hear from us within 7 days, please contact the clinic through MyChart or phone. If you have a critical or abnormal lab result, we will notify you by phone as soon as possible.  Submit refill requests through [employer] or call your pharmacy and they will forward the refill request to us. Please allow 3 business days for your refill to be completed.          If you need to speak with a  for additional information , please call: [phone]             Additional Information About Your Visit        Care EveryWhere ID     This is your Care EveryWhere ID. This could be used by other organizations to access your [facility] medical records  [id number]        Your Vitals Were     Pulse Temperature Respirations Height BMI (Body Mass Index)       109 98.1  F (36.7  C) (Tympanic) 16 5' 11\" (1.803 m) 23.29 kg/m2        Blood Pressure from Last 3 Encounters:   10/16/18 121/78   04/09/18 125/73   03/14/18 119/73    Weight from Last 3 Encounters:   10/16/18 167 lb (75.8 kg)   04/09/18 163 lb (73.9 kg)   03/14/18 163 lb (73.9 kg)              We Performed the Following     *UA reflex to Microscopic and Culture ([place] and [facility] (except [place] and [place])          Today's " Medication Changes          These changes are accurate as of 10/16/18  4:33 PM.  If you have any questions, ask your nurse or doctor.               Start taking these medicines.        Dose/Directions    sulfamethoxazole-trimethoprim 800-160 MG per tablet   Commonly known as:  BACTRIM DS/SEPTRA DS   Used for:  Acute prostatitis   Started by:  Jesus De Anda MD        Dose:  1 tablet   Take 1 tablet by mouth 2 times daily for 14 days   Quantity:  28 tablet   Refills:  0            Where to get your medicines      These medications were sent to [id number] - [place], [place] - [address]  [address], [place] [address]    Hours:  Test fax successful 12/11/02   Phone:  [phone]     sulfamethoxazole-trimethoprim 800-160 MG per tablet                Primary Care Provider Office Phone # Fax #    [facility] [phone] [address] [facility] [address]        Equal Access to Services     ORION PARKINSON AH: Hadii aad ku hadasho Soomaali, waaxda luqadaha, qaybta kaalmada adeegyada, waxay idiin hayaan adeeg kharastevie la'jackie . So [facility] [phone].    ATENCIÓN: Si habla español, tiene a klein disposición servicios gratuitos de asistencia lingüística. [facility] [phone].    We comply with applicable federal civil rights laws and [place] laws. We do not discriminate on the basis of race, color, national origin, age, disability, sex, sexual orientation, or gender identity.            Thank you!     Thank you for choosing [facility]  for your care. Our goal is always to provide you with excellent care. Hearing back from our patients is one way we can continue to improve our services. Please take a few minutes to complete the written survey that you may receive in the mail after your visit with us. Thank you!             Your Updated Medication List - Protect others around you: Learn how to safely use, store and throw  away your medicines at www.disposemymeds.org.          This list is accurate as of 10/16/18  4:33 PM.  Always use your most recent med list.                   Brand Name Dispense Instructions for use Diagnosis    sulfamethoxazole-trimethoprim 800-160 MG per tablet    BACTRIM DS/SEPTRA DS    28 tablet    Take 1 tablet by mouth 2 times daily for 14 days    Acute prostatitis

## 2018-10-16 NOTE — PROGRESS NOTES
"  SUBJECTIVE:   Dominik Jang is a 28 year old male who presents to clinic today for the following health issues:      Genitourinary symptoms      Duration: 2-3 weeks    Description:  frequency and hesitancy and urgency and lower left abd. pain    Intensity:  mild    Accompanying signs and symptoms (fever/discharge/nausea/vomiting/back or abdominal pain):  LLQ    History (frequent UTI's/kidney stones/prostate problems): None  Sexually active: YES    Precipitating or alleviating factors: None    Therapies tried and outcome: increase fluid intake and ibuprofen   Outcome:     2 weeks of urinary daytime frequency, mild dysuria, no hematuria, significant hesitancy and double voiding.  Mild looser stools, no fever, chills, or night sweats.  Otherwise feeling OK.  Significant family medical history of prostatitis.  These symptoms feel like the first few days after his cholecystectomy surgery (when he presumably had Pringle).    No new sexual partners/encounters/exposures.    No antibiotic allergies.    1. Symptoms involving urinary system    2. Acute prostatitis        PMH: Updated and/or reviewed in chart.    Family History: Updated and/or reviewed in chart.     ROS:  See above    OBJECTIVE:                                                    /78  Pulse 109  Temp 98.1  F (36.7  C) (Tympanic)  Resp 16  Ht 5' 11\" (1.803 m)  Wt 167 lb (75.8 kg)  BMI 23.29 kg/m2    GEN: No acute distress  EYES: No conjunctival injection or icterus, EOMI grossly intact  RESP: Unlabored, regular  NEURO: Normal gait, MAEx4, light touch sensation grossly intact  PSYCH: Normal mood and affect       ASSESSMENT/PLAN:                                                    1. Symptoms involving urinary system  2. Acute prostatitis  Presumed - discussed treatment and improvement parameters.  - *UA reflex to Microscopic and Culture ([facility] (except [place] and [place])  - sulfamethoxazole-trimethoprim (BACTRIM DS/SEPTRA " DS) 800-160 MG per tablet; Take 1 tablet by mouth 2 times daily for 14 days  Dispense: 28 tablet; Refill: 0         Orders Placed This Encounter     *UA reflex to Microscopic and Culture ([facility] (except [place] and [place])     sulfamethoxazole-trimethoprim (BACTRIM DS/SEPTRA DS) 800-160 MG per tablet              Jesus De Anda MD

## 2018-10-24 ENCOUNTER — OFFICE VISIT (OUTPATIENT)
Dept: INTERNAL MEDICINE | Facility: CLINIC | Age: 28
End: 2018-10-24
Payer: COMMERCIAL

## 2018-10-24 VITALS
RESPIRATION RATE: 16 BRPM | DIASTOLIC BLOOD PRESSURE: 77 MMHG | TEMPERATURE: 98.1 F | SYSTOLIC BLOOD PRESSURE: 111 MMHG | HEART RATE: 74 BPM | WEIGHT: 167 LBS | BODY MASS INDEX: 23.29 KG/M2

## 2018-10-24 DIAGNOSIS — N41.0 ACUTE PROSTATITIS: ICD-10-CM

## 2018-10-24 DIAGNOSIS — L27.0 ALLERGIC DRUG RASH DUE TO SULFONAMIDE: Primary | ICD-10-CM

## 2018-10-24 DIAGNOSIS — T37.0X5A ALLERGIC DRUG RASH DUE TO SULFONAMIDE: Primary | ICD-10-CM

## 2018-10-24 PROCEDURE — 99213 OFFICE O/P EST LOW 20 MIN: CPT | Performed by: INTERNAL MEDICINE

## 2018-10-24 RX ORDER — CIPROFLOXACIN 500 MG/1
500 TABLET, FILM COATED ORAL 2 TIMES DAILY
Qty: 28 TABLET | Refills: 0 | Status: SHIPPED | OUTPATIENT
Start: 2018-10-24 | End: 2019-10-17

## 2018-10-24 NOTE — MR AVS SNAPSHOT
After Visit Summary   10/24/2018    Dominik Jang    MRN: [id number]           Patient Information     Date Of Birth          1990        Visit Information        Provider Department      10/24/2018 3:00 PM Jesus De Anda MD [facility]        Today's Diagnoses     Allergic drug rash due to sulfonamide    -  1    Acute prostatitis           Follow-ups after your visit        Follow-up notes from your care team     Return if symptoms worsen or fail to improve.      Who to contact     Normal or non-critical lab and imaging results will be communicated to you by MyChart, letter or phone within 4 business days after the clinic has received the results. If you do not hear from us within 7 days, please contact the clinic through MyChart or phone. If you have a critical or abnormal lab result, we will notify you by phone as soon as possible.  Submit refill requests through [employer] or call your pharmacy and they will forward the refill request to us. Please allow 3 business days for your refill to be completed.          If you need to speak with a  for additional information , please call: [phone]             Additional Information About Your Visit        Care EveryWhere ID     This is your Care EveryWhere ID. This could be used by other organizations to access your [facility] medical records  [id number]        Your Vitals Were     Pulse Temperature Respirations BMI (Body Mass Index)          74 98.1  F (36.7  C) (Tympanic) 16 23.29 kg/m2         Blood Pressure from Last 3 Encounters:   10/24/18 111/77   10/16/18 121/78   04/09/18 125/73    Weight from Last 3 Encounters:   10/24/18 167 lb (75.8 kg)   10/16/18 167 lb (75.8 kg)   04/09/18 163 lb (73.9 kg)              Today, you had the following     No orders found for display         Today's Medication Changes          These changes are accurate as of 10/24/18  3:38 PM.  If you have any questions, ask your nurse or  doctor.               Start taking these medicines.        Dose/Directions    ciprofloxacin 500 MG tablet   Commonly known as:  CIPRO   Used for:  Acute prostatitis   Started by:  Jesus De Anda MD        Dose:  500 mg   Take 1 tablet (500 mg) by mouth 2 times daily   Quantity:  28 tablet   Refills:  0         Stop taking these medicines if you haven't already. Please contact your care team if you have questions.     sulfamethoxazole-trimethoprim 800-160 MG per tablet   Commonly known as:  BACTRIM DS/SEPTRA DS   Stopped by:  Jesus De Anda MD                Where to get your medicines      These medications were sent to [facility] [address] [place], [place] - [address] OF [facility] [address]  [address], [facility] [address]    Hours:  Test fax successful 12/11/02   Phone:  [phone]     ciprofloxacin 500 MG tablet                Primary Care Provider Office Phone # Fax #    [facility] [phone] [address] [facility] [address]        Equal Access to Services     [facility] HospitalASHLEY AH: Hadii aad ku hadasho Soomaali, waaxda luqadaha, qaybta kaalmada adeegyada, waxay idiin hayaan deidre hilton . So [facility] [phone].    ATENCIÓN: Si habla español, tiene a klein disposición servicios gratuitos de asistencia lingüística. Llame al [phone].    We comply with applicable federal civil rights laws and [place] laws. We do not discriminate on the basis of race, color, national origin, age, disability, sex, sexual orientation, or gender identity.            Thank you!     Thank you for choosing [facility]  for your care. Our goal is always to provide you with excellent care. Hearing back from our patients is one way we can continue to improve our services. Please take a few minutes to complete the written survey that you may receive in the mail after your visit with us. Thank you!             Your Updated Medication List - Protect others  around you: Learn how to safely use, store and throw away your medicines at www.disposemymeds.org.          This list is accurate as of 10/24/18  3:38 PM.  Always use your most recent med list.                   Brand Name Dispense Instructions for use Diagnosis    ciprofloxacin 500 MG tablet    CIPRO    28 tablet    Take 1 tablet (500 mg) by mouth 2 times daily    Acute prostatitis

## 2018-10-24 NOTE — PROGRESS NOTES
SUBJECTIVE:   Dominik Jang is a 28 year old male who presents to clinic today for the following health issues:      Rash      Duration: Friday    Description  Location: extremities  Itching: moderate    Intensity:  moderate    Accompanying signs and symptoms: sometimes itchy    History (similar episodes/previous evaluation): None    Precipitating or alleviating factors:  New exposures:  medication on sulfa and trees  Recent travel: no      Therapies tried and outcome: hydrocortisone cream -  not effective    Started 3 days after Bactrim.  Stopped Bactrim yesterday and pruritus has improved.    Did also have a taste of honey locust pulp on day the rash started but no subsequent exposure.    Otherwise feeling well.    1. Allergic drug rash due to sulfonamide    2. Acute prostatitis        PMH: Updated and/or reviewed in chart.    ROS:  Constitutional, HEENT, cardiovascular, pulmonary, skin, gi and gu systems are otherwise negative.    OBJECTIVE:                                                    /77  Pulse 74  Temp 98.1  F (36.7  C) (Tympanic)  Resp 16  Wt 167 lb (75.8 kg)  BMI 23.29 kg/m2    GEN: No acute distress  SKIN: papular erythema without urticaria on hands, legs diffusely  EYES: No conjunctival injection or icterus, EOMI grossly intact  RESP: Unlabored, regular  PSYCH: Normal mood and affect     ASSESSMENT/PLAN:                                                    1. Allergic drug rash due to sulfonamide  2. Acute prostatitis  Stop Bactrim, finish 10-14 days of Cipro for treatment of prostatitis.  - ciprofloxacin (CIPRO) 500 MG tablet; Take 1 tablet (500 mg) by mouth 2 times daily  Dispense: 28 tablet; Refill: 0     Orders Placed This Encounter     ciprofloxacin (CIPRO) 500 MG tablet              Jesus De Anda MD

## 2019-10-17 ENCOUNTER — OFFICE VISIT (OUTPATIENT)
Dept: INTERNAL MEDICINE | Facility: CLINIC | Age: 29
End: 2019-10-17
Payer: COMMERCIAL

## 2019-10-17 VITALS
BODY MASS INDEX: 23.39 KG/M2 | HEART RATE: 61 BPM | DIASTOLIC BLOOD PRESSURE: 81 MMHG | TEMPERATURE: 98.1 F | OXYGEN SATURATION: 98 % | RESPIRATION RATE: 16 BRPM | HEIGHT: 70 IN | SYSTOLIC BLOOD PRESSURE: 133 MMHG | WEIGHT: 163.4 LBS

## 2019-10-17 DIAGNOSIS — Z23 NEED FOR PROPHYLACTIC VACCINATION AND INOCULATION AGAINST INFLUENZA: ICD-10-CM

## 2019-10-17 DIAGNOSIS — M79.674 PAIN OF TOE OF RIGHT FOOT: Primary | ICD-10-CM

## 2019-10-17 PROCEDURE — 99213 OFFICE O/P EST LOW 20 MIN: CPT | Mod: 25 | Performed by: INTERNAL MEDICINE

## 2019-10-17 PROCEDURE — 90686 IIV4 VACC NO PRSV 0.5 ML IM: CPT | Performed by: INTERNAL MEDICINE

## 2019-10-17 PROCEDURE — 90471 IMMUNIZATION ADMIN: CPT | Performed by: INTERNAL MEDICINE

## 2019-10-17 ASSESSMENT — MIFFLIN-ST. JEOR: SCORE: 1706.81

## 2019-10-17 NOTE — PROGRESS NOTES
"     Health Maintenance reviewed, patient informed that he is due for a physical.  PHQ-2 completed, Influenza vaccine given.  Isabel Fernandez CMA      Dominik Jang is a 29 year old male who presents to clinic today for the following health issues:    HPI   Right Foot Pain, Right Great Toe    Onset: 1-2 months    Description:   Location: Right Foot, Right Great Toe  Character: Sharp and Stabbing when hyperextending foot    Intensity: severe, 8/10    Progression of Symptoms: worse, intermittent when stretching, but happening more frequent    Accompanying Signs & Symptoms:  Other symptoms: radiation of pain from inner mid foot to great toe on right side - when hyperextending or stretching    History:   Previous similar pain: no       Precipitating factors:   Trauma or overuse: no     Alleviating factors:  Improved by: nothing    Therapies Tried and outcome: none    No saddle anæsthesia, incontinence of bladder or bowel.    ROS: CROS neg    S/O:  /81   Pulse 61   Temp 98.1  F (36.7  C) (Tympanic)   Resp 16   Ht 1.785 m (5' 10.28\")   Wt 74.1 kg (163 lb 6.4 oz)   SpO2 98%   BMI 23.26 kg/m    GEN: no acute distress   MUSCULOSKELETAL/NEURO: Patient elicited electric shock sensation to dorsal right MTP area with maximal plantarflexion of ankle and flexion of right great toe.  He noted improvement when removing wallet from back right pocket of pants.  No bony tenderness or visible abnormal or lower extremity joint instability, edema, or erythema.    1. Pain of toe of right foot possibly related to sciatica  We discussed compression sciatica vs lumbosacral radiculopathy.  Based on his nerve stretching, we discussed irritation and exercises to minimize triggering.  Would have him increase physical activity before trial physical therapy before any advanced imaging or corticosteroid treatment.  Patient agreed with plan and demonstrated understanding to contact us for help if not improving or sooner if " worsening or if other questions or concerns arise.    2. Need for prophylactic vaccination and inoculation against influenza  - INFLUENZA VACCINE IM > 6 MONTHS VALENT IIV4 [31033]  - Vaccine Administration, Initial [76303]

## 2020-03-01 ENCOUNTER — HEALTH MAINTENANCE LETTER (OUTPATIENT)
Age: 30
End: 2020-03-01

## 2020-12-14 ENCOUNTER — HEALTH MAINTENANCE LETTER (OUTPATIENT)
Age: 30
End: 2020-12-14

## 2021-04-17 ENCOUNTER — HEALTH MAINTENANCE LETTER (OUTPATIENT)
Age: 31
End: 2021-04-17

## 2021-10-02 ENCOUNTER — HEALTH MAINTENANCE LETTER (OUTPATIENT)
Age: 31
End: 2021-10-02

## 2022-05-14 ENCOUNTER — HEALTH MAINTENANCE LETTER (OUTPATIENT)
Age: 32
End: 2022-05-14

## 2023-01-14 ENCOUNTER — HEALTH MAINTENANCE LETTER (OUTPATIENT)
Age: 33
End: 2023-01-14

## 2023-06-02 ENCOUNTER — HEALTH MAINTENANCE LETTER (OUTPATIENT)
Age: 33
End: 2023-06-02

## 2024-06-04 NOTE — LETTER
October 18, 2018      Dominik Jang  [address]  [place] [address]        Dear ,    We are writing to inform you of your test results.    No clear infection but this often is the case with prostatitis -- keep going with the treatment and follow-up if not clearly resolved.     Resulted Orders   *UA reflex to Microscopic and Culture ([facility] (except [place] and [place])   Result Value Ref Range    Color Urine Yellow     Appearance Urine Clear     Glucose Urine Negative NEG^Negative mg/dL    Bilirubin Urine Negative NEG^Negative    Ketones Urine Negative NEG^Negative mg/dL    Specific Gravity Urine 1.015 1.003 - 1.035    Blood Urine Trace (A) NEG^Negative    pH Urine 7.0 5.0 - 7.0 pH    Protein Albumin Urine Negative NEG^Negative mg/dL    Urobilinogen Urine 0.2 0.2 - 1.0 EU/dL    Nitrite Urine Negative NEG^Negative    Leukocyte Esterase Urine Negative NEG^Negative    Source Midstream Urine    Urine Microscopic   Result Value Ref Range    WBC Urine 0 - 5 OTO5^0 - 5 /HPF    RBC Urine O - 2 OTO2^O - 2 /HPF     If you have any questions or concerns, please call the clinic at the number listed above.     Sincerely,      Jesus De Anda MD/misa          
[phone]